# Patient Record
Sex: FEMALE | Race: WHITE | URBAN - METROPOLITAN AREA
[De-identification: names, ages, dates, MRNs, and addresses within clinical notes are randomized per-mention and may not be internally consistent; named-entity substitution may affect disease eponyms.]

---

## 2019-04-03 ENCOUNTER — APPOINTMENT (RX ONLY)
Dept: URBAN - METROPOLITAN AREA CLINIC 36 | Facility: CLINIC | Age: 24
Setting detail: DERMATOLOGY
End: 2019-04-03

## 2019-04-03 DIAGNOSIS — D22 MELANOCYTIC NEVI: ICD-10-CM

## 2019-04-03 DIAGNOSIS — S90.1 CONTUSION OF TOE WITHOUT DAMAGE TO NAIL: ICD-10-CM

## 2019-04-03 PROBLEM — L85.3 XEROSIS CUTIS: Status: ACTIVE | Noted: 2019-04-03

## 2019-04-03 PROBLEM — D48.5 NEOPLASM OF UNCERTAIN BEHAVIOR OF SKIN: Status: ACTIVE | Noted: 2019-04-03

## 2019-04-03 PROBLEM — S90.122A CONTUSION OF LEFT LESSER TOE(S) WITHOUT DAMAGE TO NAIL, INITIAL ENCOUNTER: Status: ACTIVE | Noted: 2019-04-03

## 2019-04-03 PROBLEM — L70.0 ACNE VULGARIS: Status: ACTIVE | Noted: 2019-04-03

## 2019-04-03 PROCEDURE — ? OTHER

## 2019-04-03 PROCEDURE — ? COUNSELING

## 2019-04-03 PROCEDURE — 11102 TANGNTL BX SKIN SINGLE LES: CPT

## 2019-04-03 PROCEDURE — ? BIOPSY BY SHAVE METHOD

## 2019-04-03 PROCEDURE — 99202 OFFICE O/P NEW SF 15 MIN: CPT | Mod: 25

## 2019-04-03 ASSESSMENT — LOCATION SIMPLE DESCRIPTION DERM
LOCATION SIMPLE: RIGHT UPPER ARM
LOCATION SIMPLE: LEFT 2ND TOE

## 2019-04-03 ASSESSMENT — LOCATION DETAILED DESCRIPTION DERM
LOCATION DETAILED: LEFT 2ND TOENAIL
LOCATION DETAILED: RIGHT ANTERIOR PROXIMAL UPPER ARM

## 2019-04-03 ASSESSMENT — LOCATION ZONE DERM
LOCATION ZONE: ARM
LOCATION ZONE: TOENAIL

## 2019-04-03 NOTE — PROCEDURE: BIOPSY BY SHAVE METHOD
Biopsy Method: Dermablade
Bill 93170 For Specimen Handling/Conveyance To Laboratory?: no
Silver Nitrate Text: The wound bed was treated with silver nitrate after the biopsy was performed.
Detail Level: Detailed
Electrodesiccation Text: The wound bed was treated with electrodesiccation after the biopsy was performed.
Depth Of Biopsy: dermis
Electrodesiccation And Curettage Text: The wound bed was treated with electrodesiccation and curettage after the biopsy was performed.
Cryotherapy Text: The wound bed was treated with cryotherapy after the biopsy was performed.
Billing Type: Third-Party Bill
Dressing: bandage
Biopsy Type: H and E
Lab: -[id number]
Anesthesia Type: 1% lidocaine without epinephrine
Consent: Verbal consent was obtained and risks were reviewed including but not limited to scarring, infection, bleeding, scabbing, incomplete removal, nerve damage and allergy to anesthesia.
Post-Care Instructions: I reviewed with the patient in detail post-care instructions. Patient is to keep the biopsy site dry overnight, and then apply bacitracin twice daily until healed. Patient may apply hydrogen peroxide soaks to remove any crusting.
Notification Instructions: Patient will be notified of biopsy results. However, patient instructed to call the office if not contacted within 2 weeks.
X Size Of Lesion In Cm: 0
Curettage Text: The wound bed was treated with curettage after the biopsy was performed.
Was A Bandage Applied: Yes
Wound Care: Bacitracin
Hemostasis: Drysol
Type Of Destruction Used: Curettage
Anesthesia Volume In Cc (Will Not Render If 0): 0.3
Lab Facility: [id number]

## 2019-04-03 NOTE — HPI: SKIN LESION
What Type Of Note Output Would You Prefer (Optional)?: Standard Output
How Severe Is Your Skin Lesion?: moderate
Has Your Skin Lesion Been Treated?: not been treated
Is This A New Presentation, Or A Follow-Up?: Moles
Which Family Member (Optional)?: Grandfather

## 2019-04-03 NOTE — PROCEDURE: OTHER
Note Text (......Xxx Chief Complaint.): This diagnosis correlates with the
Detail Level: Detailed
Other (Free Text): If worsens or enlarges, see pods for bx

## 2019-05-23 PROCEDURE — 96374 THER/PROPH/DIAG INJ IV PUSH: CPT | Mod: 59

## 2019-05-23 PROCEDURE — 93005 ELECTROCARDIOGRAM TRACING: CPT | Performed by: PHYSICIAN ASSISTANT

## 2019-05-23 PROCEDURE — 99284 EMERGENCY DEPT VISIT MOD MDM: CPT | Mod: 25

## 2019-05-23 RX ORDER — LISDEXAMFETAMINE DIMESYLATE 40 MG/1
40 CAPSULE ORAL EVERY MORNING
COMMUNITY
End: 2024-05-31 | Stop reason: ALTCHOICE

## 2019-05-23 ASSESSMENT — ENCOUNTER SYMPTOMS
SORE THROAT: 0
ARTHRALGIAS: 0
COUGH: 0
NAUSEA: 0
ABDOMINAL PAIN: 0
FEVER: 0
HEADACHES: 0
DIFFICULTY URINATING: 0
PALPITATIONS: 0
CHILLS: 0
SHORTNESS OF BREATH: 1
JOINT SWELLING: 0
DYSURIA: 0
VOMITING: 0
DIARRHEA: 0

## 2019-05-24 ENCOUNTER — APPOINTMENT (EMERGENCY)
Dept: RADIOLOGY | Facility: HOSPITAL | Age: 24
End: 2019-05-24
Attending: EMERGENCY MEDICINE
Payer: COMMERCIAL

## 2019-05-24 ENCOUNTER — HOSPITAL ENCOUNTER (EMERGENCY)
Facility: HOSPITAL | Age: 24
Discharge: HOME | End: 2019-05-24
Attending: EMERGENCY MEDICINE
Payer: COMMERCIAL

## 2019-05-24 VITALS
DIASTOLIC BLOOD PRESSURE: 74 MMHG | BODY MASS INDEX: 23 KG/M2 | TEMPERATURE: 98 F | OXYGEN SATURATION: 100 % | HEART RATE: 80 BPM | RESPIRATION RATE: 16 BRPM | SYSTOLIC BLOOD PRESSURE: 121 MMHG | WEIGHT: 125 LBS | HEIGHT: 62 IN

## 2019-05-24 DIAGNOSIS — R07.9 CHEST PAIN, UNSPECIFIED TYPE: Primary | ICD-10-CM

## 2019-05-24 LAB
ALBUMIN SERPL-MCNC: 4.5 G/DL (ref 3.4–5)
ALP SERPL-CCNC: 71 IU/L (ref 35–126)
ALT SERPL-CCNC: 23 IU/L (ref 11–54)
ANION GAP SERPL CALC-SCNC: 11 MEQ/L (ref 3–15)
AST SERPL-CCNC: 20 IU/L (ref 15–41)
ATRIAL RATE: 83
BASOPHILS # BLD: 0.03 K/UL (ref 0.01–0.1)
BASOPHILS NFR BLD: 0.3 %
BILIRUB SERPL-MCNC: 0.7 MG/DL (ref 0.3–1.2)
BUN SERPL-MCNC: 9 MG/DL (ref 8–20)
CALCIUM SERPL-MCNC: 9.1 MG/DL (ref 8.9–10.3)
CHLORIDE SERPL-SCNC: 102 MEQ/L (ref 98–109)
CO2 SERPL-SCNC: 23 MEQ/L (ref 22–32)
CREAT SERPL-MCNC: 0.6 MG/DL
D DIMER PPP IA.FEU-MCNC: <0.27 UG/ML FEU (ref 0–0.5)
DIFFERENTIAL METHOD BLD: ABNORMAL
EOSINOPHIL # BLD: 0.04 K/UL (ref 0.04–0.36)
EOSINOPHIL NFR BLD: 0.4 %
ERYTHROCYTE [DISTWIDTH] IN BLOOD BY AUTOMATED COUNT: 12.8 % (ref 11.7–14.4)
GFR SERPL CREATININE-BSD FRML MDRD: >60 ML/MIN/1.73M*2
GLUCOSE SERPL-MCNC: 94 MG/DL (ref 70–99)
HCG UR QL: NEGATIVE
HCT VFR BLDCO AUTO: 41.9 %
HGB BLD-MCNC: 14.4 G/DL
IMM GRANULOCYTES # BLD AUTO: 0.02 K/UL (ref 0–0.08)
IMM GRANULOCYTES NFR BLD AUTO: 0.2 %
LYMPHOCYTES # BLD: 3.1 K/UL (ref 1.2–3.5)
LYMPHOCYTES NFR BLD: 32.8 %
MCH RBC QN AUTO: 30.3 PG (ref 28–33.2)
MCHC RBC AUTO-ENTMCNC: 34.4 G/DL (ref 32.2–35.5)
MCV RBC AUTO: 88 FL (ref 83–98)
MONOCYTES # BLD: 0.81 K/UL (ref 0.28–0.8)
MONOCYTES NFR BLD: 8.6 %
NEUTROPHILS # BLD: 5.46 K/UL (ref 1.7–7)
NEUTS SEG NFR BLD: 57.7 %
NRBC BLD-RTO: 0 %
P AXIS: 64
PDW BLD AUTO: 9.9 FL (ref 9.4–12.3)
PLATELET # BLD AUTO: 325 K/UL
POTASSIUM SERPL-SCNC: 3.2 MEQ/L (ref 3.6–5.1)
PR INTERVAL: 120
PROT SERPL-MCNC: 7.7 G/DL (ref 6–8.2)
QRS DURATION: 64
QT INTERVAL: 354
QTC CALCULATION(BAZETT): 415
R AXIS: 69
RBC # BLD AUTO: 4.76 M/UL (ref 3.93–5.22)
SODIUM SERPL-SCNC: 136 MEQ/L (ref 136–144)
T WAVE AXIS: 30
VENTRICULAR RATE: 83
WBC # BLD AUTO: 9.46 K/UL

## 2019-05-24 PROCEDURE — 3E0333Z INTRODUCTION OF ANTI-INFLAMMATORY INTO PERIPHERAL VEIN, PERCUTANEOUS APPROACH: ICD-10-PCS | Performed by: EMERGENCY MEDICINE

## 2019-05-24 PROCEDURE — 36415 COLL VENOUS BLD VENIPUNCTURE: CPT | Performed by: PHYSICIAN ASSISTANT

## 2019-05-24 PROCEDURE — 80053 COMPREHEN METABOLIC PANEL: CPT | Performed by: PHYSICIAN ASSISTANT

## 2019-05-24 PROCEDURE — 84703 CHORIONIC GONADOTROPIN ASSAY: CPT | Performed by: PHYSICIAN ASSISTANT

## 2019-05-24 PROCEDURE — 63700000 HC SELF-ADMINISTRABLE DRUG: Performed by: PHYSICIAN ASSISTANT

## 2019-05-24 PROCEDURE — 85379 FIBRIN DEGRADATION QUANT: CPT | Performed by: PHYSICIAN ASSISTANT

## 2019-05-24 PROCEDURE — 85025 COMPLETE CBC W/AUTO DIFF WBC: CPT | Performed by: PHYSICIAN ASSISTANT

## 2019-05-24 PROCEDURE — 71046 X-RAY EXAM CHEST 2 VIEWS: CPT

## 2019-05-24 PROCEDURE — 63600000 HC DRUGS/DETAIL CODE: Performed by: PHYSICIAN ASSISTANT

## 2019-05-24 RX ORDER — ACETAMINOPHEN 325 MG/1
650 TABLET ORAL ONCE
Status: COMPLETED | OUTPATIENT
Start: 2019-05-24 | End: 2019-05-24

## 2019-05-24 RX ORDER — KETOROLAC TROMETHAMINE 15 MG/ML
15 INJECTION, SOLUTION INTRAMUSCULAR; INTRAVENOUS ONCE
Status: COMPLETED | OUTPATIENT
Start: 2019-05-24 | End: 2019-05-24

## 2019-05-24 RX ADMIN — KETOROLAC TROMETHAMINE 15 MG: 15 INJECTION INTRAMUSCULAR; INTRAVENOUS at 02:31

## 2019-05-24 RX ADMIN — ACETAMINOPHEN 650 MG: 325 TABLET, FILM COATED ORAL at 01:52

## 2019-05-24 NOTE — ED PROVIDER NOTES
HPI     No chief complaint on file.    23 y.o. female presents to ED c/o chest pain x 2100. Pt reports chest pain and SOB that began tonight while watching TV. Pain intermittently radiates into her L back. Pain is 9/10. Describes pain as stabbing and shooting. Pt reports a similar episode 1 year ago that lasted 1 hour and resolved on its own. Pt traveled to the [facility] 3 weeks ago. Denies recent stressors. Denies hx of GERD, DVT, and PE. Denies fever, chills, cough, palpitations, leg swelling/pain, N/V. Pt's grandmother  suddenly at the age of 29, unknown cause of death.         History provided by:  Patient   used: No         Patient History     Past Medical History:   Diagnosis Date   • ADHD        Past Surgical History:   Procedure Laterality Date   • NASAL SEPTUM SURGERY         No family history on file.    Social History   Substance Use Topics   • Smoking status: Never Smoker   • Smokeless tobacco: Not on file   • Alcohol use Yes      Comment: occasionally       Systems Reviewed from Nursing Triage:          Review of Systems     Review of Systems   Constitutional: Negative for chills and fever.   HENT: Negative for ear pain and sore throat.    Respiratory: Positive for shortness of breath. Negative for cough.    Cardiovascular: Positive for chest pain. Negative for palpitations and leg swelling.   Gastrointestinal: Negative for abdominal pain, diarrhea, nausea and vomiting.   Endocrine: Negative for polyuria.   Genitourinary: Negative for difficulty urinating and dysuria.   Musculoskeletal: Negative for arthralgias and joint swelling.   Skin: Negative for rash.   Neurological: Negative for headaches.        Physical Exam     ED Triage Vitals [19 2243]   Temp Heart Rate Resp BP SpO2   36.9 °C (98.5 °F) 86 18 109/70 100 %      Temp Source Heart Rate Source Patient Position BP Location FiO2 (%) (Set)   Temporal -- -- Right upper arm --                     Patient Vitals for the past  "24 hrs:   BP Temp Temp src Pulse Resp SpO2 Height Weight   05/23/19 2243 109/70 36.9 °C (98.5 °F) Temporal 86 18 100 % 1.575 m (5' 2\") 56.7 kg (125 lb)           Physical Exam   Constitutional: She is oriented to person, place, and time. She appears well-developed. No distress.   HENT:   Head: Normocephalic and atraumatic.   Eyes: Conjunctivae are normal.   Neck: Neck supple.   Cardiovascular: Normal rate, regular rhythm, normal heart sounds and intact distal pulses.    No murmur heard.  Pulses:       Dorsalis pedis pulses are 2+ on the right side, and 2+ on the left side.   Pulmonary/Chest: Effort normal and breath sounds normal. No respiratory distress. She exhibits no tenderness.   Abdominal: Soft. There is no tenderness.   Musculoskeletal: Normal range of motion. She exhibits no tenderness.   Neurological: She is alert and oriented to person, place, and time.   5/5 strength in bilateral lower and upper extremities  No sensory deficit   Skin: Skin is warm and dry. Capillary refill takes less than 2 seconds. No rash noted.   Psychiatric: She has a normal mood and affect. Her behavior is normal.   Nursing note and vitals reviewed.           Procedures    ED Course & MDM     Labs Reviewed   CBC AND DIFFERENTIAL    Narrative:     The following orders were created for panel order CBC and differential.  Procedure                               Abnormality         Status                     ---------                               -----------         ------                     CBC[[id number]]                                                                          Diff Count[[id number]]                                                                     Please view results for these tests on the individual orders.   COMPREHENSIVE METABOLIC PANEL   BHCG, SERUM, QUAL   CBC   DIFF COUNT       ECG 12 lead    (Results Pending)               [facility]         ED Course as of May 24 0341   u May 23, 2019   1888 Impression: chest pain " and SOB x 2100  Plan: BhCG, labs, CXR, D-dimer  []   Fri May 24, 2019   0137 D-dimer negative.  EKG is nonischemic.  Patient sleeping upon me entering exam room.  States his pain is still not 10.  Give her a dose of Tylenol and reevaluate.  [MW]   0300 Patient given Toradol with significant improvement in her symptoms.  Her vital signs have remained unchanged.  Blood pressure 121/74.  100% on room air.  Heart rate 80.  Very well-appearing.  Mild hypokalemia but otherwise unremarkable labs.  Discharge with PCP follow-up.  Contact information given.  [MW]      ED Course User Index  [AH] Aliza Dai  [MW] Romain Negro PA C         Clinical Impressions as of May 24 0341   Chest pain, unspecified type          By signing my name below, IAliza, attest that this documentation has been prepared under the direction and in the presence of INES Negro PA-C.    5/23/2019 11:03 PM      Romain BROCK, personally performed the services described in this documentation, as documented by the scribe in my presence, and it is both accurate and complete.  5/24/2019 1:37 AM          Aliza Dai  05/23/19 3311       Romain Negro PA C  05/24/19 3655

## 2019-05-24 NOTE — ED ATTESTATION NOTE
The patient was evaluated and managed by the physician assistant / nurse practitioner. Discussed complaint, exam and results with pa/np and agree with assessment and plan.      Kenney River MD  05/24/19 6073

## 2019-05-24 NOTE — DISCHARGE INSTRUCTIONS
Please return to the emergency department with any new or concerning symptoms. You should take all medications as prescribed.  We have answered all of your questions and you are competent to understand.  You feel comfortable with the plan as stated.     You have been seen today for chest pain.  Your work-up in the emergency department did not reveal any emergent causes.  You can use Tylenol as needed.  Return with any fever, migrating pain, any other concerning symptoms.  We have provided you with a sheet of family doctors to follow-up with for further evaluation.  Take it easy for the next several days.  Be sure you are staying well-hydrated.     Return if you develop any migrating chest pain or back pain, persistent pain, numbness, tingling, weakness, shortness of breath the is persistent or any other new symptoms.

## 2019-12-11 ENCOUNTER — TELEPHONE (OUTPATIENT)
Dept: PRIMARY CARE | Facility: CLINIC | Age: 24
End: 2019-12-11

## 2019-12-11 NOTE — TELEPHONE ENCOUNTER
Pts mom, Gertrudis,called. She has an appt with Dr Crisostomo on 12/18/2019. She is living in [place] and is coming home for the holidays. She suffers from depression, anxiety and ADD. Mom said she has been prescibed  Vyvanse and it has been helping a lot. Would Dr Crisostomo be willing to prescibed it for 90days? She said she could fly up every 3 months to have a med ck. She has tried seeing drs in [place] but the visits and cost of the med is too high. Could mom get a call after 4 or anytime tomorrow?

## 2019-12-11 NOTE — TELEPHONE ENCOUNTER
She is asking if when she is seen could she get 90 days worth of the rx at a time. She is willing to be seen every 3 months.

## 2019-12-11 NOTE — TELEPHONE ENCOUNTER
Is she asking if we will write for this med before the pt is seen because we would be unable to do that or is she asking if our office writes for this medication for 90 day supply please clarify

## 2019-12-18 ENCOUNTER — OFFICE VISIT (OUTPATIENT)
Dept: PRIMARY CARE | Facility: CLINIC | Age: 24
End: 2019-12-18
Payer: COMMERCIAL

## 2019-12-18 VITALS
SYSTOLIC BLOOD PRESSURE: 124 MMHG | OXYGEN SATURATION: 98 % | WEIGHT: 138 LBS | DIASTOLIC BLOOD PRESSURE: 70 MMHG | HEART RATE: 72 BPM | HEIGHT: 62 IN | RESPIRATION RATE: 18 BRPM | BODY MASS INDEX: 25.4 KG/M2

## 2019-12-18 DIAGNOSIS — I47.10 PAROXYSMAL SUPRAVENTRICULAR TACHYCARDIA (CMS/HCC): ICD-10-CM

## 2019-12-18 DIAGNOSIS — F32.A DEPRESSION, UNSPECIFIED DEPRESSION TYPE: Primary | ICD-10-CM

## 2019-12-18 DIAGNOSIS — F41.9 ANXIETY: ICD-10-CM

## 2019-12-18 DIAGNOSIS — F41.0 PANIC ATTACK: ICD-10-CM

## 2019-12-18 DIAGNOSIS — R00.2 PALPITATION: ICD-10-CM

## 2019-12-18 DIAGNOSIS — F90.0 ATTENTION DEFICIT HYPERACTIVITY DISORDER (ADHD), PREDOMINANTLY INATTENTIVE TYPE: ICD-10-CM

## 2019-12-18 PROCEDURE — 99204 OFFICE O/P NEW MOD 45 MIN: CPT | Performed by: INTERNAL MEDICINE

## 2019-12-18 RX ORDER — SERTRALINE HYDROCHLORIDE 50 MG/1
50 TABLET, FILM COATED ORAL DAILY
COMMUNITY

## 2019-12-18 RX ORDER — CLONAZEPAM 1 MG/1
1 TABLET ORAL 2 TIMES DAILY
COMMUNITY
End: 2024-05-31 | Stop reason: ALTCHOICE

## 2019-12-18 ASSESSMENT — ENCOUNTER SYMPTOMS
DIFFICULTY URINATING: 0
PALPITATIONS: 1
DYSURIA: 0
ACTIVITY CHANGE: 1
MUSCULOSKELETAL NEGATIVE: 1
ENDOCRINE NEGATIVE: 1
HEMATOLOGIC/LYMPHATIC NEGATIVE: 1
SINUS PRESSURE: 1
ALLERGIC/IMMUNOLOGIC NEGATIVE: 1
DYSPHORIC MOOD: 1
GASTROINTESTINAL NEGATIVE: 1
FREQUENCY: 0
DECREASED CONCENTRATION: 1
SHORTNESS OF BREATH: 1
DIZZINESS: 1
EYES NEGATIVE: 1
NERVOUS/ANXIOUS: 1

## 2019-12-18 NOTE — PROGRESS NOTES
[facility]  Dr. Gladis Crisostomo  [address]  [place] [address]  Phone: [phone]  Fax: [phone]      Patient ID: Leta Hernandez                              : 1995    Visit Date: 19    Chief Complaint: Depression (anxiety and depression consultation)      HPI  Seen as a new patient accompanied by her mother - at her request  Was diagnosed with ADD at age 13   Has been on stimulants most of the time since then  Currently living in [place]  Experienced panic when planning to fly here and father drove to [place] to get her  Has seen cardiologist - reports heart okay  Just this week saw a family doctor in the [place] area who specilazes in pharmaco-psych who was recommended by a friend   Has been on multiple medications in the past but never controled well per patient   Has been seen in the past by psych but not recently  Is on a wait list to be seen in [place] through her insurance    Multiple concussions in the past she believes complicate her symptoms - most recent in 2018 from a fall while mountain biking      Hosptalized for pneumonia  - in [place]    Reviewed PDMP with patient - July opiates were given post surgical procedure    Depression   This is a chronic problem. The current episode started more than 1 year ago. The problem occurs constantly. Associated symptoms include chest pain and congestion (sinus - related to surgery). Pertinent negatives include no anorexia or visual change. Associated symptoms comments: Anxiety, panic attacks, rapid heart rate. The symptoms are aggravated by stress. Treatments tried: multiple meds including SSRI's. The treatment provided no relief (longterm).   Anxiety   Presents for initial visit. Onset was 6 to 12 months ago (more significant ). The problem has been gradually worsening. Symptoms include chest pain, decreased concentration, dizziness (anxiety), hyperventilation, nervous/anxious behavior,  palpitations (anxiety related), panic and shortness of breath (with panic). Symptoms occur most days. The severity of symptoms is severe (can be severe - to ER multiple times in last 12 months). Nothing aggravates the symptoms.     There are no known risk factors. Her past medical history is significant for anxiety/panic attacks and depression. There is no history of suicide attempts. Past treatments include benzodiazephines, SSRIs and non-SSRI antidepressants (details of med used not available). Improvement on treatment: not long term improvement.       Current Outpatient Medications   Medication Sig Dispense Refill   • clonazePAM (klonoPIN) 1 mg tablet Take 1 mg by mouth 2 (two) times a day.     • lisdexamfetamine (VYVANSE) 40 mg capsule Take 40 mg by mouth every morning.     • sertraline (ZOLOFT) 50 mg tablet Take 50 mg by mouth daily.       No current facility-administered medications for this visit.        No Known Allergies    Past Surgical History:   Procedure Laterality Date   • APPENDECTOMY     • NASAL SEPTUM SURGERY     • NASAL SEPTUM SURGERY         Past Medical History:   Diagnosis Date   • ADHD    • Anxiety    • Depression    • Hx of head injury        Health Maintenance   Topic Date Due   • HIV Screening  11/18/2007   • Cervical Cancer Screening  11/18/2015   • DTaP, Tdap, and Td Vaccines (7 - Td) 09/07/2016   • Influenza Vaccine (1) 01/31/2020 (Originally 8/1/2019)   • Varicella Vaccines  Completed   • HIB Vaccines  Completed   • IPV Vaccines  Completed   • HPV Vaccines  Completed   • Meningococcal ACWY  Aged Out   • Pneumococcal  Aged Out       Social History     Tobacco Use   • Smoking status: Never Smoker   • Smokeless tobacco: Never Used   Substance Use Topics   • Alcohol use: Yes     Comment: rarely   • Drug use: No       Family History   Problem Relation Age of Onset   • Hypertension Biological Father    • Hyperlipidemia Biological Father        The following have been reviewed and updated as  "appropriate in this visit:  Allergies  Meds  Problems         Review of Systems  Review of Systems   Constitutional: Positive for activity change (depression affecting - less active).   HENT: Positive for congestion (sinus - related to surgery) and sinus pressure.    Eyes: Negative.    Respiratory: Positive for shortness of breath (with panic).    Cardiovascular: Positive for chest pain and palpitations (anxiety related).   Gastrointestinal: Negative.  Negative for anorexia.   Endocrine: Negative.    Genitourinary: Negative for difficulty urinating, dysuria and frequency.   Musculoskeletal: Negative.    Skin: Negative.    Allergic/Immunologic: Negative.    Neurological: Positive for dizziness (anxiety).   Hematological: Negative.    Psychiatric/Behavioral: Positive for decreased concentration and dysphoric mood. The patient is nervous/anxious.         Vitals:    12/18/19 0737   BP: 124/70   BP Location: Right upper arm   Patient Position: Sitting   Pulse: 72   Resp: 18   SpO2: 98%   Weight: 62.6 kg (138 lb)   Height: 1.575 m (5' 2\")       Body mass index is 25.24 kg/m².    Physical Exam  Physical Exam   Constitutional: She is oriented to person, place, and time. She appears well-nourished. No distress.   HENT:   Head: Normocephalic.   Nose: Nose normal.   Mouth/Throat: Uvula is midline, oropharynx is clear and moist and mucous membranes are normal.   Neck: Trachea normal, normal range of motion and phonation normal. Neck supple. No thyromegaly present.   Cardiovascular: Normal rate, regular rhythm, S1 normal and S2 normal.  No extrasystoles are present. Exam reveals no gallop.   No murmur heard.  Pulmonary/Chest: Effort normal and breath sounds normal.   Abdominal: Soft. Bowel sounds are normal. She exhibits no distension and no mass. There is no hepatosplenomegaly. There is no tenderness.   Musculoskeletal: She exhibits no edema, tenderness or deformity.   Neurological: She is alert and oriented to person, " place, and time. Gait normal.   Skin: Skin is warm and dry. No pallor.   Psychiatric: Her speech is normal and behavior is normal. Thought content normal. Her mood appears anxious (mildly). Cognition and memory are normal. She exhibits a depressed mood (mildly - wants to be made to feel maryana).   Vitals reviewed.      Assessment/Plan     Problem List Items Addressed This Visit        Circulatory    Paroxysmal supraventricular tachycardia (CMS/HCC)    Overview     Last Assessment & Plan:   In sum, this 21 year old woman has a history of a recurrent, highly symptomatic SVT. We have discussed the etiology, natural history and potential treatment options in detail today. We discussed a number of potential treatments including continuing on a mohan blocking medication, addition of an antiarrhythmic medication or a catheter ablation procedure. We reviewed the electrophysiology study and catheter ablation procedure in detail including the risks, benefits, treatment options and expected outcomes.     I fully agree with her physicians in [facility] that the next step is for her to consider an electrophysiology study to define the nature of her arrhythmia.     For the next steps, we have recommended:  - Holding further beta-blockers  - She will likely move forward with her scheduled ablation for SVT    Thank you for allowing us to participate in her care.         Current Assessment & Plan     This overview reviewed following visit with patient   Note from 2017  Will have RN contact patient to obtain recent information  - ER note from 8/2019 refers to cardiologist visit to take place 4 days from visit            Other    Depression - Primary    Current Assessment & Plan     See panic attack plan  Advised patient that in order to get effective care, I believe she should have a psychiatric evaluation so that the correct diagnoses can be made  Schedule with behavioral specialist ASAP         Relevant Medications    sertraline  (ZOLOFT) 50 mg tablet    Other Relevant Orders    [facility] Order for Co-Located Behaviorist Practices    Panic attack    Current Assessment & Plan     Multiple ER visits in [facility]  Given lorazepam by family practitioner  Needs psych evaluation - advised can continue to see FP doctor she saw with special interest in psych or go through her insurance to see a specialist in psych  Referred through [facility] for psych evaluation ASAP         Attention deficit hyperactivity disorder (ADHD), predominantly inattentive type    Current Assessment & Plan     Currently on Vyvance  Advised patient that I believe she needs one behavioral specialist to manage her medication   Her ADD medication should come from her behavioral specialist         Relevant Medications    sertraline (ZOLOFT) 50 mg tablet    Anxiety    Current Assessment & Plan     See panic attack plan           Other Visit Diagnoses     Palpitation        see PSVT summary from 2017 and plan attached - need to contact pt           Return for follow up dependent on review of [facility] content and pt condition.          Gladis Crisostomo MD  12/21/2019

## 2019-12-21 ENCOUNTER — TELEPHONE (OUTPATIENT)
Dept: PRIMARY CARE | Facility: CLINIC | Age: 24
End: 2019-12-21

## 2019-12-21 PROBLEM — F41.0 PANIC ATTACK: Status: ACTIVE | Noted: 2019-12-21

## 2019-12-21 PROBLEM — F41.9 ANXIETY: Status: ACTIVE | Noted: 2019-12-21

## 2019-12-21 PROBLEM — F90.0 ATTENTION DEFICIT HYPERACTIVITY DISORDER (ADHD), PREDOMINANTLY INATTENTIVE TYPE: Status: ACTIVE | Noted: 2019-12-21

## 2019-12-21 PROBLEM — F32.A DEPRESSION: Status: ACTIVE | Noted: 2019-12-21

## 2019-12-21 ASSESSMENT — ENCOUNTER SYMPTOMS
HYPERVENTILATION: 1
ANOREXIA: 0
PANIC: 1
VISUAL CHANGE: 0

## 2019-12-21 NOTE — TELEPHONE ENCOUNTER
Please check with this patient regarding information from her late summer cardiology visit in [place] that was referred to in an ER visit note from 8/2019. Please have her have that fowarded.

## 2019-12-21 NOTE — ASSESSMENT & PLAN NOTE
Multiple ER visits in [facility]  Given lorazepam by family practitioner  Needs psych evaluation - advised can continue to see FP doctor she saw with special interest in psych or go through her insurance to see a specialist in psych  Referred through [facility] for psych evaluation ASAP

## 2019-12-21 NOTE — ASSESSMENT & PLAN NOTE
Currently on Vyvance  Advised patient that I believe she needs one behavioral specialist to manage her medication   Her ADD medication should come from her behavioral specialist

## 2019-12-21 NOTE — ASSESSMENT & PLAN NOTE
No records immediately available at time of visit  Will review what is in EP[facility] and request additional info from patient as indicated

## 2019-12-21 NOTE — ASSESSMENT & PLAN NOTE
See panic attack plan  Advised patient that in order to get effective care, I believe she should have a psychiatric evaluation so that the correct diagnoses can be made  Schedule with behavioral specialist ASAP

## 2019-12-21 NOTE — ASSESSMENT & PLAN NOTE
This overview reviewed following visit with patient   Note from 2017  Will have RN contact patient to obtain recent information  - ER note from 8/2019 refers to cardiologist visit to take place 4 days from visit

## 2019-12-23 NOTE — TELEPHONE ENCOUNTER
I spoke with Leta she said she was seen a couple times with a Cardio in [place], [place] , she is calling them to fax last 3 office notes.

## 2020-01-07 NOTE — TELEPHONE ENCOUNTER
I spoke with this patient she said she asked them to fax her records I said we did not get them yet , I told her I would fax a request to Dr Jonathan Villalba at [facility] Rylie Moffett the fax is [phone]

## 2020-05-05 ENCOUNTER — PATIENT OUTREACH (OUTPATIENT)
Dept: CASE MANAGEMENT | Facility: CLINIC | Age: 25
End: 2020-05-05

## 2020-09-22 ENCOUNTER — PATIENT OUTREACH (OUTPATIENT)
Dept: CASE MANAGEMENT | Facility: CLINIC | Age: 25
End: 2020-09-22

## 2020-09-22 NOTE — PROGRESS NOTES
Ambulatory Care Management note:     Attempted patient outreach to assess needs in regard to longitudinal care management.  No answer.  LMOM with name, number, and role.  I encouraged patient to call PCP office for any health concerns or appt needs, and to return my call with any non-urgent questions or concerns.       Phyllis RYANN RN  Ambulatory Care Manager, [address], [address]  [place] [address]  Email: [email or web address]  Office: [phone]

## 2020-10-09 ENCOUNTER — PATIENT OUTREACH (OUTPATIENT)
Dept: CASE MANAGEMENT | Facility: CLINIC | Age: 25
End: 2020-10-09

## 2020-10-09 NOTE — PROGRESS NOTES
Ambulatory Care Management note:     Attempted patient outreach to assess needs in regard to longitudinal care management.  No answer.  LMOM with name, number, and role.  I encouraged patient to call PCP office for any health concerns or appt needs, and to return my call with any non-urgent questions or concerns.      Phyllis RYANN RN  Ambulatory Care Manager, [address], [address]  [place] [address]  Email: [email or web address]  Office: [phone]

## 2020-11-13 ENCOUNTER — PATIENT OUTREACH (OUTPATIENT)
Dept: CASE MANAGEMENT | Facility: CLINIC | Age: 25
End: 2020-11-13

## 2020-11-13 NOTE — PROGRESS NOTES
Ambulatory Care Management note:     Attempted patient outreach to assess needs in regard to longitudinal care management.  No answer.  LMOM with name, number, and role.  I encouraged patient to call PCP office for any health concerns or appt needs, and to return my call with any non-urgent questions or concerns.       Phyllis RYANN RN  Ambulatory Care Manager, [address], [address]  [place] [address]  Email: [email or web address]  Office: [phone]

## 2020-12-14 ENCOUNTER — PATIENT OUTREACH (OUTPATIENT)
Dept: CASE MANAGEMENT | Facility: CLINIC | Age: 25
End: 2020-12-14

## 2020-12-14 NOTE — PROGRESS NOTES
Ambulatory Care Management note:    Attempted patient outreach with chronic care management call.  No answer.  LMOM with name and number for patient to return call with any non-urgent questions or concerns and to call PCP for any health concerns or appt needs.  Unable to reach patient with multiple attempts.  If no call back received within 24-48 hours, I will close to care management.  Pt has my contact info for future needs.       Phyllis RYANN RN  Ambulatory Care Manager, [address], [address]  [place] [address]  Email: [email or web address]  Office: [phone]

## 2024-05-30 ENCOUNTER — HOSPITAL ENCOUNTER (EMERGENCY)
Facility: HOSPITAL | Age: 29
Discharge: HOME | End: 2024-05-30
Attending: STUDENT IN AN ORGANIZED HEALTH CARE EDUCATION/TRAINING PROGRAM | Admitting: STUDENT IN AN ORGANIZED HEALTH CARE EDUCATION/TRAINING PROGRAM
Payer: COMMERCIAL

## 2024-05-30 VITALS
RESPIRATION RATE: 18 BRPM | TEMPERATURE: 98.6 F | DIASTOLIC BLOOD PRESSURE: 68 MMHG | HEART RATE: 92 BPM | SYSTOLIC BLOOD PRESSURE: 113 MMHG | OXYGEN SATURATION: 98 %

## 2024-05-30 DIAGNOSIS — A69.20 ERYTHEMA MIGRANS (LYME DISEASE): Primary | ICD-10-CM

## 2024-05-30 LAB
ALBUMIN SERPL-MCNC: 4.4 G/DL (ref 3.5–5.7)
ALP SERPL-CCNC: 69 IU/L (ref 34–125)
ALT SERPL-CCNC: 21 IU/L (ref 7–52)
ANION GAP SERPL CALC-SCNC: 11 MEQ/L (ref 3–15)
AST SERPL-CCNC: 17 IU/L (ref 13–39)
BASOPHILS # BLD: 0.03 K/UL (ref 0.01–0.1)
BASOPHILS NFR BLD: 0.3 %
BILIRUB SERPL-MCNC: 0.5 MG/DL (ref 0.3–1.2)
BUN SERPL-MCNC: 6 MG/DL (ref 7–25)
CALCIUM SERPL-MCNC: 9.4 MG/DL (ref 8.6–10.3)
CHLORIDE SERPL-SCNC: 103 MEQ/L (ref 98–107)
CO2 SERPL-SCNC: 23 MEQ/L (ref 21–31)
CREAT SERPL-MCNC: 0.7 MG/DL (ref 0.6–1.2)
DIFFERENTIAL METHOD BLD: ABNORMAL
EGFRCR SERPLBLD CKD-EPI 2021: >60 ML/MIN/1.73M*2
EOSINOPHIL # BLD: 0.02 K/UL (ref 0.04–0.36)
EOSINOPHIL NFR BLD: 0.2 %
ERYTHROCYTE [DISTWIDTH] IN BLOOD BY AUTOMATED COUNT: 12.8 % (ref 11.7–14.4)
GLUCOSE SERPL-MCNC: 87 MG/DL (ref 70–99)
HCG UR QL: NEGATIVE
HCT VFR BLD AUTO: 44.2 % (ref 35–45)
HGB BLD-MCNC: 15.1 G/DL (ref 11.8–15.7)
IMM GRANULOCYTES # BLD AUTO: 0.02 K/UL (ref 0–0.08)
IMM GRANULOCYTES NFR BLD AUTO: 0.2 %
LYMPHOCYTES # BLD: 1.81 K/UL (ref 1.2–3.5)
LYMPHOCYTES NFR BLD: 19.7 %
MCH RBC QN AUTO: 32.3 PG (ref 28–33.2)
MCHC RBC AUTO-ENTMCNC: 34.2 G/DL (ref 32.2–35.5)
MCV RBC AUTO: 94.4 FL (ref 83–98)
MONOCYTES # BLD: 0.7 K/UL (ref 0.28–0.8)
MONOCYTES NFR BLD: 7.6 %
NEUTROPHILS # BLD: 6.63 K/UL (ref 1.7–7)
NEUTS SEG NFR BLD: 72 %
NRBC BLD-RTO: 0 %
PDW BLD AUTO: 10.1 FL (ref 9.4–12.3)
PLATELET # BLD AUTO: 334 K/UL (ref 150–369)
POTASSIUM SERPL-SCNC: 3.7 MEQ/L (ref 3.5–5.1)
PROT SERPL-MCNC: 7.5 G/DL (ref 6–8.2)
RBC # BLD AUTO: 4.68 M/UL (ref 3.93–5.22)
SODIUM SERPL-SCNC: 137 MEQ/L (ref 136–145)
WBC # BLD AUTO: 9.21 K/UL (ref 3.8–10.5)

## 2024-05-30 PROCEDURE — 86618 LYME DISEASE ANTIBODY: CPT

## 2024-05-30 PROCEDURE — 99283 EMERGENCY DEPT VISIT LOW MDM: CPT

## 2024-05-30 PROCEDURE — 80053 COMPREHEN METABOLIC PANEL: CPT

## 2024-05-30 PROCEDURE — 93005 ELECTROCARDIOGRAM TRACING: CPT | Performed by: STUDENT IN AN ORGANIZED HEALTH CARE EDUCATION/TRAINING PROGRAM

## 2024-05-30 PROCEDURE — 93005 ELECTROCARDIOGRAM TRACING: CPT

## 2024-05-30 PROCEDURE — 80053 COMPREHEN METABOLIC PANEL: CPT | Performed by: STUDENT IN AN ORGANIZED HEALTH CARE EDUCATION/TRAINING PROGRAM

## 2024-05-30 PROCEDURE — 85025 COMPLETE CBC W/AUTO DIFF WBC: CPT | Performed by: STUDENT IN AN ORGANIZED HEALTH CARE EDUCATION/TRAINING PROGRAM

## 2024-05-30 PROCEDURE — 85025 COMPLETE CBC W/AUTO DIFF WBC: CPT

## 2024-05-30 PROCEDURE — 84703 CHORIONIC GONADOTROPIN ASSAY: CPT

## 2024-05-30 PROCEDURE — 36415 COLL VENOUS BLD VENIPUNCTURE: CPT

## 2024-05-30 PROCEDURE — 84703 CHORIONIC GONADOTROPIN ASSAY: CPT | Performed by: STUDENT IN AN ORGANIZED HEALTH CARE EDUCATION/TRAINING PROGRAM

## 2024-05-30 PROCEDURE — 63700000 HC SELF-ADMINISTRABLE DRUG

## 2024-05-30 RX ORDER — AMOXICILLIN 500 MG/1
500 CAPSULE ORAL 3 TIMES DAILY
Qty: 42 CAPSULE | Refills: 0 | Status: SHIPPED | OUTPATIENT
Start: 2024-05-30 | End: 2024-06-13

## 2024-05-30 RX ORDER — AMOXICILLIN 250 MG/1
500 CAPSULE ORAL ONCE
Status: COMPLETED | OUTPATIENT
Start: 2024-05-30 | End: 2024-05-30

## 2024-05-30 RX ADMIN — AMOXICILLIN 500 MG: 250 CAPSULE ORAL at 19:42

## 2024-05-30 ASSESSMENT — ENCOUNTER SYMPTOMS
VOMITING: 0
FEVER: 0
ABDOMINAL PAIN: 0
WEAKNESS: 0
NAUSEA: 0
CHILLS: 0
NUMBNESS: 0

## 2024-05-30 NOTE — ED PROVIDER NOTES
Emergency Medicine Note  HPI   HISTORY OF PRESENT ILLNESS     Patient is a 28-year-old female with a past medical history of ADHD, anxiety, depression presenting for rash.  Patient states that last week, she was in [place].  She states she was laying on the beach a lot and got sunburn while there.  Additionally, she was swimming in several pools which she found that one of them was shut down due to abnormal water found in the pool.  2 days ago since returning, she has noticed a rash on her right lateral upper thigh that is sensitive to touch.  Out of concern for the rash, she came to the ER for further evaluation.  She denies any fever, chills, abdominal pain, nausea, vomiting, weakness, numbness.  Patient further denies any recent tick exposures.          Patient History   PAST HISTORY     Reviewed from Nursing Triage:       Past Medical History:   Diagnosis Date    ADHD     Anxiety     Depression     Hx of head injury        Past Surgical History:   Procedure Laterality Date    APPENDECTOMY      NASAL SEPTUM SURGERY      NASAL SEPTUM SURGERY         Family History   Problem Relation Age of Onset    Hypertension Biological Father     Hyperlipidemia Biological Father        Social History     Tobacco Use    Smoking status: Never    Smokeless tobacco: Never   Substance Use Topics    Alcohol use: Yes     Comment: rarely    Drug use: No         Review of Systems   REVIEW OF SYSTEMS     Review of Systems   Constitutional:  Negative for chills and fever.   Gastrointestinal:  Negative for abdominal pain, nausea and vomiting.   Skin:  Positive for rash (R upper lateral thigh).   Neurological:  Negative for weakness and numbness.         VITALS     ED Vitals      Date/Time Temp Pulse Resp BP SpO2 [facility]   05/30/24 1950 -- 92 18 113/68 98 % [facility]   05/30/24 1748 37 °C (98.6 °F) -- -- -- -- GC   05/30/24 1747 -- 134 18 136/92 98 %           Pulse Ox %: 98 % (05/30/24 1747)  Pulse Ox Interpretation: Normal (05/30/24 1747)            Physical Exam   PHYSICAL EXAM     Physical Exam  Constitutional:       General: She is not in acute distress.     Appearance: Normal appearance. She is not ill-appearing, toxic-appearing or diaphoretic.   HENT:      Head: Normocephalic and atraumatic.      Mouth/Throat:      Mouth: Mucous membranes are moist.      Pharynx: Oropharynx is clear.   Eyes:      Extraocular Movements: Extraocular movements intact.      Conjunctiva/sclera: Conjunctivae normal.      Pupils: Pupils are equal, round, and reactive to light.   Pulmonary:      Effort: Pulmonary effort is normal. No respiratory distress.   Musculoskeletal:      Cervical back: Normal range of motion and neck supple.      Right hip: No tenderness or bony tenderness. Normal range of motion.      Right upper leg: Tenderness present. No bony tenderness.      Right knee: No bony tenderness. Normal range of motion. No tenderness.      Right lower leg: No tenderness or bony tenderness.      Right ankle: No tenderness. Normal range of motion.      Right foot: Normal range of motion. No tenderness or bony tenderness.        Legs:    Skin:     General: Skin is warm and dry.      Capillary Refill: Capillary refill takes less than 2 seconds.      Findings: Rash present.   Neurological:      Mental Status: She is alert and oriented to person, place, and time.           PROCEDURES     Procedures     DATA     Results       Procedure Component Value Units Date/Time    Lyme EIA reflex WB [[id number]] Collected: 05/30/24 1755    Specimen: Blood, Venous Updated: 05/30/24 1900    Comprehensive metabolic panel [[id number]]  (Abnormal) Collected: 05/30/24 1755    Specimen: Blood, Venous Updated: 05/30/24 1831     Sodium 137 mEQ/L      Potassium 3.7 mEQ/L      Comment: Results obtained on plasma. Plasma Potassium values may be up to 0.4 mEQ/L less than serum values. The differences may be greater for patients with high platelet or white cell counts.        Chloride 103 mEQ/L      CO2 23  mEQ/L      BUN 6 mg/dL      Creatinine 0.7 mg/dL      Glucose 87 mg/dL      Calcium 9.4 mg/dL      AST (SGOT) 17 IU/L      ALT (SGPT) 21 IU/L      Alkaline Phosphatase 69 IU/L      Total Protein 7.5 g/dL      Comment: Test performed on plasma which typically contains approximately 0.4 g/dL more protein than serum.        Albumin 4.4 g/dL      Bilirubin, Total 0.5 mg/dL      eGFR >60.0 mL/min/1.73m*2      Comment: Calculation based on the Chronic Kidney Disease Epidemiology Collaboration (CKD-EPI) equation refit without adjustment for race.        Anion Gap 11 mEQ/L     BhCG, Serum, Qual (if menstruating female and no urine) [[id number]]  (Normal) Collected: 05/30/24 1755    Specimen: Blood, Venous Updated: 05/30/24 1827     Preg Test, Serum Negative    CBC and differential [[id number]]  (Abnormal) Collected: 05/30/24 1755    Specimen: Blood, Venous Updated: 05/30/24 1807     WBC 9.21 K/uL      RBC 4.68 M/uL      Hemoglobin 15.1 g/dL      Hematocrit 44.2 %      MCV 94.4 fL      MCH 32.3 pg      MCHC 34.2 g/dL      RDW 12.8 %      Platelets 334 K/uL      MPV 10.1 fL      Differential Type Auto     nRBC 0.0 %      Immature Granulocytes 0.2 %      Neutrophils 72.0 %      Lymphocytes 19.7 %      Monocytes 7.6 %      Eosinophils 0.2 %      Basophils 0.3 %      Immature Granulocytes, Absolute 0.02 K/uL      Neutrophils, Absolute 6.63 K/uL      Lymphocytes, Absolute 1.81 K/uL      Monocytes, Absolute 0.70 K/uL      Eosinophils, Absolute 0.02 K/uL      Basophils, Absolute 0.03 K/uL     [facility] Blood Culture [[id number]] Collected: 05/30/24 1755    Specimen: Blood, Venous Updated: 05/30/24 1802    [facility] Draw Panel [[id number]] Collected: 05/30/24 1755    Specimen: Blood, Venous Updated: 05/30/24 1802    Narrative:      The following orders were created for panel order [facility] Draw Panel.  Procedure                               Abnormality         Status                     ---------                               -----------          ------                     GILES LAVENDER[[id number]]                                 In process                 [facility][[id number]]                            In process                   Please view results for these tests on the individual orders.    GILES BURROUGHS [[id number]] Collected: 05/30/24 1755    Specimen: Blood, Venous Updated: 05/30/24 1802            Imaging Results    None         ECG 12 lead          Scoring tools                                  ED Course & MDM   MDM / ED COURSE / CLINICAL IMPRESSION / DISPO     Medical Decision Making  Problems Addressed:  Erythema migrans (Lyme disease): acute illness or injury    Amount and/or Complexity of Data Reviewed  Labs: ordered.  ECG/medicine tests: ordered.    Risk  Prescription drug management.        ED Course as of 05/31/24 0206   Fri May 31, 2024   0206 Reviewed lab results with patient.  Provided with first dose of amoxicillin while in ER as patient is allergic to doxycycline.  She will closely outpatient with PCP.  Rx sent to patient's pharmacy for remaining dose of amoxicillin.  Strict return precautions discussed.  Patient expressed understanding and is agreeable plan [AB]      ED Course User Index  [AB] Lashawn Heath PA C     Clinical Impression      Erythema migrans (Lyme disease)     _________________       ED Disposition   Discharge                       Lashawn Heath PA C  05/31/24 0208

## 2024-05-30 NOTE — DISCHARGE INSTRUCTIONS
Follow-up with PCP regarding recent ER visit and for continued care.  Call for appointment    Take antibiotics as instructed by bottle for the next 14 days    Return to ER for any new worsening symptoms such as not limited to fever, worsening rash, chest pain, shortness of breath, intractable nausea vomiting, dizziness/lightheadedness, or any other concerns

## 2024-05-31 ENCOUNTER — APPOINTMENT (EMERGENCY)
Dept: RADIOLOGY | Facility: HOSPITAL | Age: 29
End: 2024-05-31
Attending: EMERGENCY MEDICINE
Payer: COMMERCIAL

## 2024-05-31 ENCOUNTER — HOSPITAL ENCOUNTER (EMERGENCY)
Facility: HOSPITAL | Age: 29
Discharge: HOME | End: 2024-05-31
Attending: EMERGENCY MEDICINE
Payer: COMMERCIAL

## 2024-05-31 VITALS
TEMPERATURE: 98.2 F | SYSTOLIC BLOOD PRESSURE: 102 MMHG | OXYGEN SATURATION: 100 % | RESPIRATION RATE: 16 BRPM | DIASTOLIC BLOOD PRESSURE: 62 MMHG | HEART RATE: 82 BPM

## 2024-05-31 DIAGNOSIS — R29.0 CARPOPEDAL SPASM: Primary | ICD-10-CM

## 2024-05-31 DIAGNOSIS — L03.115 CELLULITIS OF RIGHT THIGH: ICD-10-CM

## 2024-05-31 LAB
ALBUMIN SERPL-MCNC: 4.6 G/DL (ref 3.5–5.7)
ALP SERPL-CCNC: 70 IU/L (ref 34–125)
ALT SERPL-CCNC: 17 IU/L (ref 7–52)
ANION GAP SERPL CALC-SCNC: 8 MEQ/L (ref 3–15)
AST SERPL-CCNC: 19 IU/L (ref 13–39)
ATRIAL RATE: 115
B BURGDOR AB SER IA-ACNC: 0.36 RATIO
BASOPHILS # BLD: 0.03 K/UL (ref 0.01–0.1)
BASOPHILS NFR BLD: 0.3 %
BILIRUB SERPL-MCNC: 0.6 MG/DL (ref 0.3–1.2)
BUN SERPL-MCNC: 5 MG/DL (ref 7–25)
CALCIUM SERPL-MCNC: 9.6 MG/DL (ref 8.6–10.3)
CHLORIDE SERPL-SCNC: 103 MEQ/L (ref 98–107)
CO2 SERPL-SCNC: 27 MEQ/L (ref 21–31)
CREAT SERPL-MCNC: 0.6 MG/DL (ref 0.6–1.2)
DIFFERENTIAL METHOD BLD: ABNORMAL
EGFRCR SERPLBLD CKD-EPI 2021: >60 ML/MIN/1.73M*2
EOSINOPHIL # BLD: 0.04 K/UL (ref 0.04–0.36)
EOSINOPHIL NFR BLD: 0.5 %
ERYTHROCYTE [DISTWIDTH] IN BLOOD BY AUTOMATED COUNT: 13 % (ref 11.7–14.4)
GLUCOSE SERPL-MCNC: 76 MG/DL (ref 70–99)
HCT VFR BLD AUTO: 44.1 % (ref 35–45)
HGB BLD-MCNC: 15.7 G/DL (ref 11.8–15.7)
IMM GRANULOCYTES # BLD AUTO: 0.02 K/UL (ref 0–0.08)
IMM GRANULOCYTES NFR BLD AUTO: 0.2 %
LYMPHOCYTES # BLD: 1.66 K/UL (ref 1.2–3.5)
LYMPHOCYTES NFR BLD: 18.7 %
MAGNESIUM SERPL-MCNC: 2.2 MG/DL (ref 1.8–2.5)
MCH RBC QN AUTO: 32.6 PG (ref 28–33.2)
MCHC RBC AUTO-ENTMCNC: 35.6 G/DL (ref 32.2–35.5)
MCV RBC AUTO: 91.7 FL (ref 83–98)
MONOCYTES # BLD: 0.91 K/UL (ref 0.28–0.8)
MONOCYTES NFR BLD: 10.2 %
NEUTROPHILS # BLD: 6.22 K/UL (ref 1.7–7)
NEUTS SEG NFR BLD: 70.1 %
NRBC BLD-RTO: 0 %
P AXIS: 68
PDW BLD AUTO: 10.3 FL (ref 9.4–12.3)
PLATELET # BLD AUTO: 323 K/UL (ref 150–369)
POTASSIUM SERPL-SCNC: 4.2 MEQ/L (ref 3.5–5.1)
PR INTERVAL: 112
PROT SERPL-MCNC: 7.6 G/DL (ref 6–8.2)
QRS DURATION: 64
QT INTERVAL: 304
QTC CALCULATION(BAZETT): 420
R AXIS: 75
RBC # BLD AUTO: 4.81 M/UL (ref 3.93–5.22)
SODIUM SERPL-SCNC: 138 MEQ/L (ref 136–145)
T WAVE AXIS: 33
TSH SERPL DL<=0.05 MIU/L-ACNC: 0.49 MIU/L (ref 0.34–5.6)
VENTRICULAR RATE: 115
WBC # BLD AUTO: 8.88 K/UL (ref 3.8–10.5)

## 2024-05-31 PROCEDURE — 63700000 HC SELF-ADMINISTRABLE DRUG: Performed by: EMERGENCY MEDICINE

## 2024-05-31 PROCEDURE — 96360 HYDRATION IV INFUSION INIT: CPT | Mod: 59

## 2024-05-31 PROCEDURE — 85025 COMPLETE CBC W/AUTO DIFF WBC: CPT

## 2024-05-31 PROCEDURE — 80053 COMPREHEN METABOLIC PANEL: CPT | Performed by: EMERGENCY MEDICINE

## 2024-05-31 PROCEDURE — 99284 EMERGENCY DEPT VISIT MOD MDM: CPT | Mod: 25

## 2024-05-31 PROCEDURE — 25800000 HC PHARMACY IV SOLUTIONS: Performed by: EMERGENCY MEDICINE

## 2024-05-31 PROCEDURE — 83735 ASSAY OF MAGNESIUM: CPT | Performed by: EMERGENCY MEDICINE

## 2024-05-31 PROCEDURE — 36415 COLL VENOUS BLD VENIPUNCTURE: CPT

## 2024-05-31 PROCEDURE — 84443 ASSAY THYROID STIM HORMONE: CPT | Performed by: EMERGENCY MEDICINE

## 2024-05-31 PROCEDURE — 3E0337Z INTRODUCTION OF ELECTROLYTIC AND WATER BALANCE SUBSTANCE INTO PERIPHERAL VEIN, PERCUTANEOUS APPROACH: ICD-10-PCS | Performed by: EMERGENCY MEDICINE

## 2024-05-31 PROCEDURE — 85025 COMPLETE CBC W/AUTO DIFF WBC: CPT | Performed by: EMERGENCY MEDICINE

## 2024-05-31 PROCEDURE — 70450 CT HEAD/BRAIN W/O DYE: CPT

## 2024-05-31 RX ORDER — SULFAMETHOXAZOLE AND TRIMETHOPRIM 800; 160 MG/1; MG/1
1 TABLET ORAL ONCE
Status: COMPLETED | OUTPATIENT
Start: 2024-05-31 | End: 2024-05-31

## 2024-05-31 RX ORDER — ALPRAZOLAM 0.5 MG/1
0.5 TABLET ORAL 3 TIMES DAILY PRN
COMMUNITY

## 2024-05-31 RX ORDER — SULFAMETHOXAZOLE AND TRIMETHOPRIM 800; 160 MG/1; MG/1
1 TABLET ORAL 2 TIMES DAILY
Qty: 14 TABLET | Refills: 0 | Status: SHIPPED | OUTPATIENT
Start: 2024-05-31 | End: 2024-06-07

## 2024-05-31 RX ORDER — DEXTROAMPHETAMINE SACCHARATE, AMPHETAMINE ASPARTATE MONOHYDRATE, DEXTROAMPHETAMINE SULFATE AND AMPHETAMINE SULFATE 3.75; 3.75; 3.75; 3.75 MG/1; MG/1; MG/1; MG/1
30 CAPSULE, EXTENDED RELEASE ORAL EVERY MORNING
COMMUNITY

## 2024-05-31 RX ADMIN — SODIUM CHLORIDE 1000 ML: 9 INJECTION, SOLUTION INTRAVENOUS at 17:37

## 2024-05-31 RX ADMIN — SULFAMETHOXAZOLE AND TRIMETHOPRIM 1 TABLET: 800; 160 TABLET ORAL at 19:46

## 2024-05-31 NOTE — ED ATTESTATION NOTE
I personally saw and evaluated the patient, participated in the management, and agree with the findings in the note above except as where stated. The physician assistant and I discussed the case, workup, and disposition.     28-year-old female past medical history significant for ADHD anxiety depression presenting for evaluation of rash.  Patient reports last week she was in [place] and was on a boat, went into several swimming pools that may have been contaminated with bacteria.  She reports 2 days ago she noted a rash to her right upper lateral thigh that has been painful and worsening.  Denies fevers.  She does report mild lightheadedness and fatigue.  Denies tick exposures.    Patient is awake alert resting comfortably  On her lateral proximal right thigh she has a rash that appears to have a central area of clearing bull's-eye-like with increased erythema to the superior aspect of the rash with an additional patch  Rash is flat  No induration or fluctuance  She is tender to palpation over the rash  It is blanching    28-year female presenting for evaluation of rash.  Given concern for possible Lyme's with targetoid appearance will treat.  Patient has an allergy to doxycycline.  Will give amoxicillin.     Jean-Paul Salazar MD  05/30/24 8261

## 2024-06-03 NOTE — ED PROVIDER NOTES
Emergency Medicine Note  HPI   HISTORY OF PRESENT ILLNESS     28-year-old past medical history ADHD, anxiety, depression who presents with episode of spasms in both of her hands.  She reports that she was sitting on her couch today when suddenly her hands cramped and she was unable to straighten them.  She also felt like she was unable to move for a brief period of time.  And has now resolved.  She did not think she felt anxious prior to the episode but was feeling anxious during the episode.  She did not have any focal weakness, difficulty with her speech or vision, chest pain or shortness of breath.  No nausea vomiting or diarrhea.  She was seen at an outside hospital yesterday and diagnosed with possible Lyme's disease.  Over the past few days she has noticed a rash to her right thigh.  It is not painful.  It has not spread.  She did travel to the beach in [place] recently.  She was given amoxicillin at [place] which she took 1 dose last night.  This morning her primary doctor switched her to cefuroxime and the symptoms started about an hour after taking a dose.  She had no tongue swelling or hives.      History provided by:  Patient and parent  Anxiety        Patient History   PAST HISTORY     Reviewed from Nursing Triage:       Past Medical History:   Diagnosis Date   • ADHD    • Anxiety    • Depression    • Hx of head injury        Past Surgical History:   Procedure Laterality Date   • APPENDECTOMY     • NASAL SEPTUM SURGERY     • NASAL SEPTUM SURGERY         Family History   Problem Relation Age of Onset   • Hypertension Biological Father    • Hyperlipidemia Biological Father        Social History     Tobacco Use   • Smoking status: Never   • Smokeless tobacco: Never   Substance Use Topics   • Alcohol use: Yes     Comment: rarely   • Drug use: No         Review of Systems   REVIEW OF SYSTEMS     Review of Systems      VITALS     ED Vitals    Date/Time Temp Pulse Resp BP SpO2 [facility]   05/31/24 2026 -- 82 16  102/62 100 % LP   05/31/24 1815 -- 100 -- -- 100 % KAD   05/31/24 1730 -- 102 -- -- 99 % KAD   05/31/24 1720 -- 83 16 116/73 100 % EE   05/31/24 1416 36.8 °C (98.2 °F) 107 18 119/83 100 % MB        Pulse Ox %: 100 % (05/31/24 1854)  Pulse Ox Interpretation: Normal (05/31/24 1854)           Physical Exam   PHYSICAL EXAM     Physical Exam  Vitals reviewed.   Constitutional:       General: She is not in acute distress.     Appearance: Normal appearance.   HENT:      Head: Normocephalic.      Nose: Nose normal.      Mouth/Throat:      Mouth: Mucous membranes are moist.   Cardiovascular:      Rate and Rhythm: Normal rate and regular rhythm.      Pulses: Normal pulses.      Heart sounds: Normal heart sounds.   Pulmonary:      Effort: Pulmonary effort is normal.      Breath sounds: Normal breath sounds.   Abdominal:      General: Abdomen is flat.   Musculoskeletal:         General: No swelling or tenderness. Normal range of motion.      Cervical back: Normal range of motion.   Skin:     Capillary Refill: Capillary refill takes less than 2 seconds.      Findings: Rash present.             Comments: Erythematous rash to lateral aspect of upper right thigh, does not appear like bull's-eye rash   Neurological:      General: No focal deficit present.      Mental Status: She is alert and oriented to person, place, and time.           PROCEDURES     Procedures     DATA     Results     Procedure Component Value Units Date/Time    TSH w reflex FT4 [[id number]]  (Normal) Collected: 05/31/24 1539    Specimen: Blood, Venous Updated: 05/31/24 1757     TSH 0.49 mIU/L     Magnesium [[id number]]  (Normal) Collected: 05/31/24 1539    Specimen: Blood, Venous Updated: 05/31/24 1742     Magnesium 2.2 mg/dL     Comprehensive metabolic panel [[id number]]  (Abnormal) Collected: 05/31/24 1539    Specimen: Blood, Venous Updated: 05/31/24 1624     Sodium 138 mEQ/L      Potassium 4.2 mEQ/L      Comment: Results obtained on plasma. Plasma Potassium  values may be up to 0.4 mEQ/L less than serum values. The differences may be greater for patients with high platelet or white cell counts.        Chloride 103 mEQ/L      CO2 27 mEQ/L      BUN 5 mg/dL      Creatinine 0.6 mg/dL      Glucose 76 mg/dL      Calcium 9.6 mg/dL      AST (SGOT) 19 IU/L      ALT (SGPT) 17 IU/L      Alkaline Phosphatase 70 IU/L      Total Protein 7.6 g/dL      Comment: Test performed on plasma which typically contains approximately 0.4 g/dL more protein than serum.        Albumin 4.6 g/dL      Bilirubin, Total 0.6 mg/dL      eGFR >60.0 mL/min/1.73m*2      Comment: Calculation based on the Chronic Kidney Disease Epidemiology Collaboration (CKD-EPI) equation refit without adjustment for race.        Anion Gap 8 mEQ/L     CBC and differential [[id number]]  (Abnormal) Collected: 05/31/24 1539    Specimen: Blood, Venous Updated: 05/31/24 1603     WBC 8.88 K/uL      RBC 4.81 M/uL      Hemoglobin 15.7 g/dL      Hematocrit 44.1 %      MCV 91.7 fL      MCH 32.6 pg      MCHC 35.6 g/dL      RDW 13.0 %      Platelets 323 K/uL      MPV 10.3 fL      Differential Type Auto     nRBC 0.0 %      Immature Granulocytes 0.2 %      Neutrophils 70.1 %      Lymphocytes 18.7 %      Monocytes 10.2 %      Eosinophils 0.5 %      Basophils 0.3 %      Immature Granulocytes, Absolute 0.02 K/uL      Neutrophils, Absolute 6.22 K/uL      Lymphocytes, Absolute 1.66 K/uL      Monocytes, Absolute 0.91 K/uL      Eosinophils, Absolute 0.04 K/uL      Basophils, Absolute 0.03 K/uL           Imaging Results          CT HEAD WITHOUT IV CONTRAST (Final result)  Result time 05/31/24 17:22:31    Final result                 Impression:    IMPRESSION: No acute intracranial hemorrhage or mass effect. If high clinical  concern for acute infarction, MR imaging would be more sensitive.             Narrative:    CLINICAL HISTORY:  dizziness    COMMENT:    Comparison:  7/15/2011.    TECHNIQUE: CT of the brain was performed and  reconstructed/reformatted in the  axial, coronal and sagittal planes.  CT DOSE:  One or more dose reduction techniques (e.g. automated exposure  control, adjustment of the mA and/or kV according to patient size, use of  iterative reconstruction technique) utilized for this examination.    INTRAVENOUS CONTRAST: None    Brain parenchyma:  Gray-white matter differentiation is normal.  No evidence  of intracranial hemorrhage, midline shift or other mass effect.  No evidence of  an extra-axial fluid collection.  Ventricles and cisterns:  Normal in size and configuration.  Cranial carotid arteries: Limited assessment of the vasculature on this  nonenhanced study.  Calvarium and extra cranial soft tissues:  No evidence of a displaced  calvarial fracture.   Scalp soft tissue within normal limits.  Visualized paranasal sinuses and mastoid air cells:  Clear.                                No orders to display       Scoring tools                                  ED Course & MDM   MDM / ED COURSE / CLINICAL IMPRESSION / DISPO     Medical Decision Making  28-year-old female who presents with episode of spasms in both of her hands history most concerning for carpopedal spasm possibly from medications versus anxiety.  Patient is on antibiotics for rash on her right thigh, Lyme test was negative will treat patient for cellulitis    Carpopedal spasm: acute illness or injury  Cellulitis of right thigh: acute illness or injury  Amount and/or Complexity of Data Reviewed  Independent Historian: parent  External Data Reviewed: labs and notes.     Details: ED visit 5/30/2024, Lyme test negative  Labs: ordered.     Details: Reviewed results, normal WBC  Radiology: ordered.      Risk  Prescription drug management.          ED Course as of 06/02/24 2153   Fri May 31, 2024   2152 Patient nervous to continue taking cefuroxime given episode of pain and spasms we will switch her to Bactrim to treat cellulitis patient was given a dose in the  emergency department and tolerated it well with no reaction [DIPTI]      ED Course User Index  [DIPTI] Abril Salazar MD     Clinical Impression      Carpopedal spasm   Cellulitis of right thigh     _________________     ED Disposition   Discharge                   Abril Salazar MD  06/02/24 0982